# Patient Record
Sex: MALE | Race: WHITE | NOT HISPANIC OR LATINO | Employment: FULL TIME | URBAN - METROPOLITAN AREA
[De-identification: names, ages, dates, MRNs, and addresses within clinical notes are randomized per-mention and may not be internally consistent; named-entity substitution may affect disease eponyms.]

---

## 2017-09-16 ENCOUNTER — HOSPITAL ENCOUNTER (EMERGENCY)
Facility: CLINIC | Age: 27
Discharge: HOME | End: 2017-09-16
Attending: FAMILY MEDICINE | Admitting: FAMILY MEDICINE

## 2017-09-16 ENCOUNTER — APPOINTMENT (OUTPATIENT)
Dept: GENERAL RADIOLOGY | Facility: CLINIC | Age: 27
End: 2017-09-16
Attending: FAMILY MEDICINE

## 2017-09-16 VITALS
SYSTOLIC BLOOD PRESSURE: 112 MMHG | BODY MASS INDEX: 24.33 KG/M2 | DIASTOLIC BLOOD PRESSURE: 76 MMHG | WEIGHT: 155 LBS | HEIGHT: 67 IN | TEMPERATURE: 97.7 F | RESPIRATION RATE: 20 BRPM | HEART RATE: 74 BPM | OXYGEN SATURATION: 100 %

## 2017-09-16 DIAGNOSIS — S29.9XXA CHEST WALL INJURY, INITIAL ENCOUNTER: ICD-10-CM

## 2017-09-16 PROCEDURE — 71020 XR CHEST 2 VW: CPT

## 2017-09-16 PROCEDURE — 99283 EMERGENCY DEPT VISIT LOW MDM: CPT

## 2017-09-16 PROCEDURE — 99284 EMERGENCY DEPT VISIT MOD MDM: CPT | Performed by: FAMILY MEDICINE

## 2017-09-16 RX ORDER — TRAMADOL HYDROCHLORIDE 50 MG/1
50-100 TABLET ORAL EVERY 6 HOURS PRN
Qty: 15 TABLET | Refills: 0 | Status: SHIPPED | OUTPATIENT
Start: 2017-09-16

## 2017-09-16 NOTE — ED AVS SNAPSHOT
[facility] Emergency Department    [address] [address]    Phone:  [phone]    Fax:  [phone]                                       Raul Gaston Jr.   MRN: [id number]    Department:  [facility] Emergency Department   Date of Visit:  9/16/2017           After Visit Summary Signature Page     I have received my discharge instructions, and my questions have been answered. I have discussed any challenges I see with this plan with the nurse or doctor.    ..........................................................................................................................................  Patient/Patient Representative Signature      ..........................................................................................................................................  Patient Representative Print Name and Relationship to Patient    ..................................................               ................................................  Date                                            Time    ..........................................................................................................................................  Reviewed by Signature/Title    ...................................................              ..............................................  Date                                                            Time

## 2017-09-16 NOTE — ED PROVIDER NOTES
"HPI      Patient is a 26-year-old male presenting with injury to his right posterior chest.  This occurred at about 10:30 AM this morning.  No prior chest or lung injury reported.  No significant past medical history.  No medication on a regular basis.  The patient was getting into his truck when he slipped and fell down to the ground, onto his left hip.  As he did so, his upper body twisted and he felt a \"pop\" in his right posterior chest.  This has been continuing since the injury.  With movement he feels the \"pop\" and there is severe pain that follows.  He denies dyspnea.  No hemoptysis.  No abdominal pain.  No fainting.  No other injury reported.    ROS: All other review of systems are negative other than that noted above.    PMH: Reviewed.  SH: Reviewed.  FH: Reviewed.        PRIMARY SURVEY  Airway: The airway is intact.  The patient has clear, appropriate responses to questions.  There is no observed face, neck, or upper chest trauma.  The patient has no respiratory distress and there is no stridor.  There is no oropharyngeal cavity disruption, trauma to the teeth or tongue.  There is no palpable crepitus or swelling of the anterior neck.    Spine: Nontender to palpation along the cervical or thoracic spine.    Breathing and Ventilation: There is no observed chest wall injury.  The chest wall moves symmetrically and evenly while breathing.  Breathe sounds are equal and full at the axilla and apices, bilaterally.  There is no palpable crepitus or deformity of the chest.    Circulation:  There is no observed exsanguinating blood loss.  There is a palpable pulse at the carotid artery.     Disability and Neurologic Evaluation:  PEERL.  EOM are intact.  Is moving upper and lower extremities equally bilaterally.  There are no lateralizing symptoms and sensation is grossly intact to touch.    GCS ARRIVAL  Motor 6=Obeys commands   Verbal 5=Oriented   Eye Opening 4=Spontaneous   Total: 15     Exposure and " "Environmental: No signs of injury after exposure. Normal temperature.      SECONDARY SURVEY  /80  Pulse 74  Temp 97.7  F (36.5  C) (Oral)  Resp 20  Ht 1.702 m (5' 7\")  Wt 70.3 kg (155 lb)  SpO2 100%  BMI 24.28 kg/m2  General: Patient is alert and in moderate distress.  Smiling and comfortable and sitting still.  Severe pain with any movement.  Neurological: Alert.  Moving upper and lower extremities equally, bilaterally.  Head / Neck: Atraumatic.  Ears: Not done.  Eyes: Pupils are equal, round, and reactive.  Normal conjunctiva.  Nose: Midline.  No epistaxis.  Mouth / Throat: No ulcerations or lesions.  Upper pharynx is not erythematous.  Moist.  Respiratory: No respiratory distress. CTA B.  Cardiovascular: Regular rhythm.  Peripheral extremities are warm.  No edema.  No calf tenderness.  Abdomen / Pelvis: Not tender.  No distention.  Soft throughout.  Genitalia: Not done.  Musculoskeletal: The patient has point tenderness just below the right scapula along the right posterior chest.  Skin: Mild abrasion in this area.      ED COURSE  1210.  Patient has an injury to his right chest after a fall from standing height onto the ground.    IMAGING  Images reviewed by me.  Radiology report also reviewed.  XR Chest 2 Views   Preliminary Result   IMPRESSION: No pneumothorax is seen. No rib fractures are identified.              1310.  X-rays unremarkable.  Chest wall injury diagnosed.  Continue with ibuprofen and Tylenol.  Prescription for tramadol was given, #15 tablets.  Follow up discussed.      IMPRESSION    ICD-10-CM    1. Chest wall injury, initial encounter S29.9XXA                                 Tello Navas MD  09/16/17 1310    "

## 2017-09-16 NOTE — ED NOTES
"Pt fell getting out of truck this morning around 1030. He grabbed the took and said his body twisted.He has pain in his back by right ribs and does not radiate. He states that it hurts taking breaths and \"it feels like something is cracking\". Pt took \"a huandful\" of ibuprofen after the fall. When asked how much a handful was, he stated \"about 2000 mg\". Denies SOA.  "

## 2017-09-16 NOTE — ED AVS SNAPSHOT
[facility] Emergency Department    [address] [address]    Phone:  [phone]    Fax:  [phone]                                       Raul Gaston Jr.   MRN: [id number]    Department:  [facility] Emergency Department   Date of Visit:  9/16/2017           Patient Information     Date Of Birth          1990        Your diagnoses for this visit were:     Chest wall injury, initial encounter        You were seen by Tello Navas MD.        Discharge Instructions       Return to the Emergency Room if the following occurs:     Worsened breathing, fever >101, or for any concern at anytime.    Or, follow-up with the following provider as we discussed:     Return to your primary doctor as needed, or if not improved over the next 2 weeks.    Medications discussed:    Ibuprofen 600 mg every six hours for pain (7 days duration).  Tylenol 1000 mg every six hours for pain (7 days duration).  Therefore, you can alternate these every three hours and do it safely.  Tramadol for pain not relieved by the above.    If you received pain-relieving or sedating medication during your time in the ER, avoid alcohol, driving automobiles, or working with machinery.  Also, a responsible adult must stay with you.      If you had X-rays or labs done we will attempt to contact you if there is a change needed in your care.      Call the Nurse Advice Line at [phone] or [phone] for any concern at anytime.      24 Hour Appointment Hotline       To make an appointment at any [facility] clinic, call [phone] ([phone]). If you don't have a family doctor or clinic, we will help you find one. [facility] clinics are conveniently located to serve the needs of you and your family.             Review of your medicines      START taking        Dose / Directions Last dose taken    traMADol 50 MG tablet   Commonly known as:  ULTRAM   Dose:   mg   Quantity:  15 tablet        Take  1-2 tablets ( mg) by mouth every 6 hours as needed for pain   Refills:  0          Our records show that you are taking the medicines listed below. If these are incorrect, please call your family doctor or clinic.        Dose / Directions Last dose taken    NO ACTIVE MEDICATIONS        .   Refills:  0                Prescriptions were sent or printed at these locations (1 Prescription)                   Other Prescriptions                Printed at Department/Unit printer (1 of 1)         traMADol (ULTRAM) 50 MG tablet                Procedures and tests performed during your visit     XR Chest 2 Views      Orders Needing Specimen Collection     None      Pending Results     Date and Time Order Name Status Description    9/16/2017 1215 XR Chest 2 Views Preliminary             Pending Culture Results     No orders found from 9/14/2017 to 9/17/2017.            Pending Results Instructions     If you had any lab results that were not finalized at the time of your Discharge, you can call the ED Lab Result RN at [phone]. You will be contacted by this team for any positive Lab results or changes in treatment. The nurses are available 7 days a week from 10A to 6:30P.  You can leave a message 24 hours per day and they will return your call.        Test Results From Your Hospital Stay        9/16/2017 12:53 PM      Narrative     XR CHEST 2 VW   9/16/2017 12:37 PM     HISTORY: R posterior chest injury    COMPARISON: None.    FINDINGS: The heart is negative.  The lungs are clear. The pulmonary  vasculature is normal.  The bones and soft tissues are unremarkable.        Impression     IMPRESSION: No pneumothorax is seen. No rib fractures are identified.                     Thank you for choosing [facility]       Thank you for choosing [facility] for your care. Our goal is always to provide you with excellent care. Hearing back from our patients is one way we can continue to improve our services. Please take a few minutes  "to complete the written survey that you may receive in the mail after you visit with us. Thank you!        [employer] Information     [employer] lets you send messages to your doctor, view your test results, renew your prescriptions, schedule appointments and more. To sign up, go to [email or web address] . Click on \"Log in\" on the left side of the screen, which will take you to the Welcome page. Then click on \"Sign up Now\" on the right side of the page.     You will be asked to enter the access code listed below, as well as some personal information. Please follow the directions to create your username and password.     Your access code is: [email or web address]  Expires: 12/15/2017  1:10 PM     Your access code will  in 90 days. If you need help or a new code, please call your [facility] clinic or [phone].        Care EveryWhere ID     This is your Care EveryWhere ID. This could be used by other organizations to access your [facility] medical records  [id number]        Equal Access to Services     [facility]: Hadii cathryn quick [place], waaxda luqadaha, qaybta kaalmaluis adedonte, josh barrios . So [facility] [phone].    ATENCIÓN: Si habla español, tiene a lyman disposición servicios gratuitos de asistencia lingüística. Llame al [phone].    We comply with applicable federal civil rights laws and [place] laws. We do not discriminate on the basis of race, color, national origin, age, disability sex, sexual orientation or gender identity.            After Visit Summary       This is your record. Keep this with you and show to your community pharmacist(s) and doctor(s) at your next visit.                  "

## 2017-09-16 NOTE — DISCHARGE INSTRUCTIONS
Return to the Emergency Room if the following occurs:     Worsened breathing, fever >101, or for any concern at anytime.    Or, follow-up with the following provider as we discussed:     Return to your primary doctor as needed, or if not improved over the next 2 weeks.    Medications discussed:    Ibuprofen 600 mg every six hours for pain (7 days duration).  Tylenol 1000 mg every six hours for pain (7 days duration).  Therefore, you can alternate these every three hours and do it safely.  Tramadol for pain not relieved by the above.    If you received pain-relieving or sedating medication during your time in the ER, avoid alcohol, driving automobiles, or working with machinery.  Also, a responsible adult must stay with you.      If you had X-rays or labs done we will attempt to contact you if there is a change needed in your care.      Call the Nurse Advice Line at [phone] or [phone] for any concern at anytime.

## 2023-07-31 ENCOUNTER — HOSPITAL ENCOUNTER (EMERGENCY)
Facility: CLINIC | Age: 33
Discharge: HOME | End: 2023-07-31
Attending: EMERGENCY MEDICINE | Admitting: EMERGENCY MEDICINE
Payer: COMMERCIAL

## 2023-07-31 ENCOUNTER — APPOINTMENT (OUTPATIENT)
Dept: GENERAL RADIOLOGY | Facility: CLINIC | Age: 33
End: 2023-07-31
Attending: EMERGENCY MEDICINE
Payer: COMMERCIAL

## 2023-07-31 VITALS
DIASTOLIC BLOOD PRESSURE: 86 MMHG | BODY MASS INDEX: 23.86 KG/M2 | HEIGHT: 67 IN | WEIGHT: 152 LBS | RESPIRATION RATE: 18 BRPM | TEMPERATURE: 98.2 F | SYSTOLIC BLOOD PRESSURE: 140 MMHG | HEART RATE: 118 BPM | OXYGEN SATURATION: 97 %

## 2023-07-31 DIAGNOSIS — S82.832A CLOSED FRACTURE OF DISTAL END OF LEFT FIBULA, UNSPECIFIED FRACTURE MORPHOLOGY, INITIAL ENCOUNTER: ICD-10-CM

## 2023-07-31 PROCEDURE — 73610 X-RAY EXAM OF ANKLE: CPT | Mod: LT

## 2023-07-31 PROCEDURE — 29515 APPLICATION SHORT LEG SPLINT: CPT

## 2023-07-31 PROCEDURE — 29515 APPLICATION SHORT LEG SPLINT: CPT | Performed by: EMERGENCY MEDICINE

## 2023-07-31 PROCEDURE — 99284 EMERGENCY DEPT VISIT MOD MDM: CPT

## 2023-07-31 PROCEDURE — 99284 EMERGENCY DEPT VISIT MOD MDM: CPT | Mod: 25 | Performed by: EMERGENCY MEDICINE

## 2023-07-31 ASSESSMENT — ACTIVITIES OF DAILY LIVING (ADL): ADLS_ACUITY_SCORE: 35

## 2023-08-01 ASSESSMENT — ENCOUNTER SYMPTOMS
CHILLS: 0
VOMITING: 0
FEVER: 0
SHORTNESS OF BREATH: 0
WEAKNESS: 0
NUMBNESS: 0
ARTHRALGIAS: 1
NAUSEA: 0

## 2023-08-01 NOTE — ED PROVIDER NOTES
"  History     Chief Complaint   Patient presents with     Ankle Pain     HPI  Raul Gaston Jr. is a 32 year old male who was outside a short time ago when he rolled his left ankle.  He has some pain with ambulation.  He denies any numbness or weakness.  He has no fever or chills.  He has no nausea or vomiting.  He denies other injuries.  He does have a history of osteogenesis imperfecta with brittle bone disease.    Allergies:  No Known Allergies    Problem List:    Patient Active Problem List    Diagnosis Date Noted     Osteogenesis imperfecta 10/05/2016     Priority: Medium     Chronic motor or vocal tic disorder 03/14/2008     Priority: Medium     Personal history of tobacco use, presenting hazards to health 03/14/2008     Priority: Medium     Obsessive-compulsive disorder 03/11/2008     Priority: Medium     Problem list name updated by automated process. Provider to review          Past Medical History:    Past Medical History:   Diagnosis Date     Osteogenesis imperfecta        Past Surgical History:    No past surgical history on file.    Family History:    No family history on file.    Social History:  Marital Status:   [2]  Social History     Tobacco Use     Smoking status: Some Days     Types: Cigarettes   Substance Use Topics     Alcohol use: Yes     Drug use: No        Medications:    NO ACTIVE MEDICATIONS  traMADol (ULTRAM) 50 MG tablet          Review of Systems   Constitutional: Negative for chills and fever.   Respiratory: Negative for shortness of breath.    Cardiovascular: Negative for chest pain.   Gastrointestinal: Negative for nausea and vomiting.   Musculoskeletal: Positive for arthralgias and gait problem.   Neurological: Negative for weakness and numbness.       Physical Exam   BP: (!) 140/86  Pulse: 118  Temp: 98.2  F (36.8  C)  Resp: 18  Height: 170.2 cm (5' 7\")  Weight: 68.9 kg (152 lb)  SpO2: 97 %      Physical Exam  Vitals and nursing note reviewed.   Constitutional:       " General: He is not in acute distress.     Appearance: He is well-developed. He is not diaphoretic.   HENT:      Head: Normocephalic and atraumatic.   Eyes:      General: No scleral icterus.     Pupils: Pupils are equal, round, and reactive to light.   Cardiovascular:      Rate and Rhythm: Normal rate and regular rhythm.      Heart sounds: Normal heart sounds. No murmur heard.  Pulmonary:      Effort: No respiratory distress.      Breath sounds: No stridor. No wheezing or rales.   Abdominal:      General: Bowel sounds are normal.      Palpations: Abdomen is soft.      Tenderness: There is no abdominal tenderness.   Musculoskeletal:         General: Swelling and tenderness present.      Cervical back: Normal range of motion and neck supple.        Feet:    Skin:     General: Skin is warm and dry.      Coloration: Skin is not pale.      Findings: No erythema or rash.   Neurological:      Mental Status: He is alert and oriented to person, place, and time.      Sensory: No sensory deficit.      Coordination: Coordination normal.         ED Course             Procedures         A posterior short leg Ortho-Glass splint was applied to the left leg.  Postplacement, patient had good distal CMS.         Results for orders placed or performed during the hospital encounter of 07/31/23 (from the past 24 hour(s))   XR Ankle Left G/E 3 Views    Narrative    EXAM: XR ANKLE LEFT G/E 3 VIEWS  LOCATION: [facility]  DATE: 7/31/2023    INDICATION: inversion injury, pain  COMPARISON: None.      Impression    IMPRESSION: Acute nondisplaced longitudinal/oblique fracture of the distal fibular metaphysis. Overlying soft tissue swelling. Multiple chronic ossicles at the lateral malleolus distally. There is normal joint spacing and alignment. The ankle mortise is   congruent. The talar dome is unremarkable. Small ankle joint effusion.       Medications - No data to display    Assessments & Plan (with Medical Decision  Making)     I have reviewed the nursing notes.    I have reviewed the findings, diagnosis, plan and need for follow up with the patient.    Patient is a 32 male with a history osteogenesis imperfecta who presents after he suffered an inversion injury to his left ankle.  He has difficulty with ambulation with associated left ankle pain and swelling.  Patient has no proximal fibular pain.  He has no other apparent injuries.    His vital signs were reviewed and showed mild tachycardia at 118 with hypertension of 140/86.  Patient did not request anything for pain management.    An x-ray was obtained and was independently reviewed by me.  There is a nondisplaced distal fibular fracture.  Ankle mortise was intact.    Short leg splint was applied.  Patient will be nonweightbearing and was instructed on crutch walking.  An Ortho  order was obtained for likely nonoperative management.  He can take Tylenol and/or ibuprofen as needed for pain.      Medical Decision Making  The patient's presentation was of moderate complexity (an acute complicated injury).    The patient's evaluation involved:  ordering and/or review of 1 test(s) in this encounter (see separate area of note for details)  independent interpretation of testing performed by another health professional (see separate area of note for details)    The patient's management necessitated moderate risk.  Patient had a posterior splint applied.        Discharge Medication List as of 7/31/2023  9:56 PM          Final diagnoses:   Closed fracture of distal end of left fibula, unspecified fracture morphology, initial encounter       7/31/2023   [facility] EMERGENCY DEPT       Deangelo Henry MD  08/01/23 2732

## 2023-08-01 NOTE — ED TRIAGE NOTES
Pt fell playing outside with remote controlled car.  Rolled L ankle.      Triage Assessment       Row Name 07/31/23 2054       Triage Assessment (Adult)    Airway WDL WDL       Cognitive/Neuro/Behavioral WDL    Cognitive/Neuro/Behavioral WDL WDL

## 2023-08-01 NOTE — DISCHARGE INSTRUCTIONS
Ice and elevation  Crutch walking, nonweightbearing left leg  Follow-up with orthopedics  Tylenol and/or ibuprofen as needed for pain

## 2023-08-31 ENCOUNTER — DOCUMENTATION ONLY (OUTPATIENT)
Dept: EMERGENCY MEDICINE | Facility: CLINIC | Age: 33
End: 2023-08-31
Payer: COMMERCIAL

## 2023-08-31 DIAGNOSIS — S82.832A CLOSED FRACTURE OF DISTAL END OF LEFT FIBULA, UNSPECIFIED FRACTURE MORPHOLOGY, INITIAL ENCOUNTER: Primary | ICD-10-CM

## 2024-03-09 ENCOUNTER — HOSPITAL ENCOUNTER (EMERGENCY)
Facility: CLINIC | Age: 34
Discharge: HOME | End: 2024-03-09
Attending: FAMILY MEDICINE | Admitting: FAMILY MEDICINE
Payer: COMMERCIAL

## 2024-03-09 ENCOUNTER — APPOINTMENT (OUTPATIENT)
Dept: GENERAL RADIOLOGY | Facility: CLINIC | Age: 34
End: 2024-03-09
Attending: FAMILY MEDICINE
Payer: COMMERCIAL

## 2024-03-09 VITALS
OXYGEN SATURATION: 99 % | DIASTOLIC BLOOD PRESSURE: 91 MMHG | RESPIRATION RATE: 18 BRPM | BODY MASS INDEX: 21.97 KG/M2 | HEIGHT: 67 IN | SYSTOLIC BLOOD PRESSURE: 135 MMHG | TEMPERATURE: 97.7 F | WEIGHT: 140 LBS | HEART RATE: 99 BPM

## 2024-03-09 DIAGNOSIS — S69.92XA WRIST INJURY, LEFT, INITIAL ENCOUNTER: ICD-10-CM

## 2024-03-09 PROCEDURE — 73110 X-RAY EXAM OF WRIST: CPT | Mod: LT

## 2024-03-09 PROCEDURE — 99284 EMERGENCY DEPT VISIT MOD MDM: CPT | Mod: 25 | Performed by: FAMILY MEDICINE

## 2024-03-09 PROCEDURE — 99283 EMERGENCY DEPT VISIT LOW MDM: CPT | Performed by: FAMILY MEDICINE

## 2024-03-09 PROCEDURE — 29125 APPL SHORT ARM SPLINT STATIC: CPT | Mod: LT | Performed by: FAMILY MEDICINE

## 2024-03-09 ASSESSMENT — COLUMBIA-SUICIDE SEVERITY RATING SCALE - C-SSRS
1. IN THE PAST MONTH, HAVE YOU WISHED YOU WERE DEAD OR WISHED YOU COULD GO TO SLEEP AND NOT WAKE UP?: NO
2. HAVE YOU ACTUALLY HAD ANY THOUGHTS OF KILLING YOURSELF IN THE PAST MONTH?: NO
6. HAVE YOU EVER DONE ANYTHING, STARTED TO DO ANYTHING, OR PREPARED TO DO ANYTHING TO END YOUR LIFE?: NO

## 2024-03-09 NOTE — DISCHARGE INSTRUCTIONS
ICD-10-CM    1. Wrist injury, left, initial encounter  S69.92XA     no signs fracture.  if using splint then take of out splint multiple times per day and perform range of motion.   avoid overuse.   ice tothe area of for 20 min/hgour for 3 days. ibiuiprofen and tylenol as  needed.  return for worsening.  follow-up 5 days for recheck and consider repeat xray.  region of pain could also be  TFCC  lig

## 2024-03-09 NOTE — ED TRIAGE NOTES
Pt was splitting wood when a piece popped out and hit him in the left wrist.      Triage Assessment (Adult)       Row Name 03/09/24 7411          Triage Assessment    Airway WDL WDL        Respiratory WDL    Respiratory WDL WDL        Skin Circulation/Temperature WDL    Skin Circulation/Temperature WDL WDL        Cardiac WDL    Cardiac WDL WDL        Peripheral/Neurovascular WDL    Peripheral Neurovascular WDL WDL        Cognitive/Neuro/Behavioral WDL    Cognitive/Neuro/Behavioral WDL WDL

## 2024-03-10 NOTE — ED PROVIDER NOTES
"  History     Chief Complaint   Patient presents with    Wrist Pain     HPI  Raul Gaston Jr. is a 33 year old male who presents with a history of osteogenesis imperfecta and an injury that occurred to the left wrist as he was splitting wood with the .  One of the pieces of wood popped back up struck him at the ulnar aspect of the left wrist.  Pain in this region.  Still has range of motion present at the wrist.  No break in the skin.  No other associated injuries.    Allergies:  No Known Allergies    Problem List:    Patient Active Problem List    Diagnosis Date Noted    Osteogenesis imperfecta 10/05/2016     Priority: Medium    Chronic motor or vocal tic disorder 03/14/2008     Priority: Medium    Personal history of tobacco use, presenting hazards to health 03/14/2008     Priority: Medium    Obsessive-compulsive disorder 03/11/2008     Priority: Medium     Problem list name updated by automated process. Provider to review          Past Medical History:    Past Medical History:   Diagnosis Date    Osteogenesis imperfecta        Past Surgical History:    No past surgical history on file.    Family History:    No family history on file.    Social History:  Marital Status:   [2]  Social History     Tobacco Use    Smoking status: Some Days     Types: Cigarettes   Substance Use Topics    Alcohol use: Yes    Drug use: No        Medications:    NO ACTIVE MEDICATIONS  traMADol (ULTRAM) 50 MG tablet          Review of Systems  ROS:  5 point ROS negative except as noted above in HPI, including Gen., Resp., CV, GI &  system review.      Physical Exam   BP: (!) 135/91  Pulse: 99  Temp: 97.7  F (36.5  C)  Resp: 18  Height: 170.2 cm (5' 7\")  Weight: 63.5 kg (140 lb)  SpO2: 99 %      Physical Exam    The left wrist with tenderness to palpation ulnar aspect.  Full range of motion of the wrist.  No significant bony tenderness and no scaphoid or radial tenderness to palpation normal ulnar median radial " nerve function motor or sensory.  Normal radial pulse.  Normal distal coloration and capillary refill.  No proximal changes.  He moves all the fingers in normal fashion extension and flexion at the DIP PIP and MCP joints.    ED Course        Procedures              Critical Care time:  none               Results for orders placed or performed during the hospital encounter of 03/09/24 (from the past 24 hour(s))   XR Wrist Left G/E 3 Views    Narrative    EXAM: XR WRIST LEFT G/E 3 VIEWS  LOCATION: [facility]  DATE: 3/9/2024    INDICATION: Trauma and pain.  COMPARISON: None.      Impression    IMPRESSION: There is no evidence of an acute displaced left wrist fracture. Joint spaces are maintained.       Medications - No data to display    Assessments & Plan (with Medical Decision Making)       MDM: Raul ROBINSON Gaston Jr. is a 33 year old male presenting with a history of osteogenesis imperfecta and a pain mostly at the ulnar wrist after a wood splitter popped one of the pieces of wood up against his left wrist resulting in pain at the ulnar aspect.  No obvious deformity normal neurovascular examination and x-ray negative for fracture.  We discussed the potential for splinting and a prefab splint and following up in 5 days for recheck x-ray.  Ultimately may need additional evaluation if he has persistence at the region of pain is also ligamentous injury such as TFCC ligament can also cause pain at that region.  He would like to undergo splinting.  This was placed.  Discussed additional recommendations as below.  I have reviewed the nursing notes.    I have reviewed the findings, diagnosis, plan and need for follow up with the patient.           Medical Decision Making  The patient's presentation was of low complexity (an acute and uncomplicated illness or injury).    The patient's evaluation involved:  ordering and/or review of 1 test(s) in this encounter (see separate area of note for  details)    The patient's management necessitated moderate risk (prescription drug management including medications given in the ED). Splint        Discharge Medication List as of 3/9/2024  5:45 PM          Final diagnoses:   Wrist injury, left, initial encounter - no signs fracture.  if using splint then take of out splint multiple times per day and perform range of motion.   avoid overuse.   ice tothe area of for 20 min/hgour for 3 days. ibiuiprofen and tylenol as  needed.  return for worsening.  follow-up 5 days for recheck and consider repeat xray.  region of pain could also be  TFCC  lig       3/9/2024   [facility] EMERGENCY DEPT       Wagner Mtz MD  03/09/24 9623